# Patient Record
(demographics unavailable — no encounter records)

---

## 2025-05-09 NOTE — REVIEW OF SYSTEMS
[Seizure] : seizures [Anxiety] : anxiety [Normal] : Musculoskeletal [Fainting] : no fainting [Headache] : no headache [Dizziness] : no dizziness [Birthmarks] : no birthmarks [FreeTextEntry8] : Tics

## 2025-05-09 NOTE — HISTORY OF PRESENT ILLNESS
[FreeTextEntry1] : 5/9/2025 with the mother. Jorge reported that few years ago she witnessed 1 seizure at night. None recently. A brain MRI was normal and a REEG showed Left temporal spikes.  [facility] also reported that over the last few months Joe has been having tic like movement with his shoulders and neck She also reported stuttering. Aoll seen to appear when the child is anxious for example when she yells at him. His friends do not ask him about the tics. Not on any medications at this time.

## 2025-05-09 NOTE — ASSESSMENT
[FreeTextEntry1] : A 15 yr  old with Hx of a single observed nocturnal seizure and recent onset of Tics. I discussed counseling, Guanfacine and CBT.  Normal exam, Brain MRI.

## 2025-05-09 NOTE — PHYSICAL EXAM
[Well-appearing] : well-appearing [Normocephalic] : normocephalic [No dysmorphic facial features] : no dysmorphic facial features [No ocular abnormalities] : no ocular abnormalities [Neck supple] : neck supple [Lungs clear] : lungs clear [Heart sounds regular in rate and rhythm] : heart sounds regular in rate and rhythm [Soft] : soft [No organomegaly] : no organomegaly [No abnormal neurocutaneous stigmata or skin lesions] : no abnormal neurocutaneous stigmata or skin lesions [Straight] : straight [No ashley or dimples] : no ashley or dimples [No deformities] : no deformities [Alert] : alert [Well related, good eye contact] : well related, good eye contact [Conversant] : conversant [Normal speech and language] : normal speech and language [Follows instructions well] : follows instructions well [VFF] : VFF [Pupils reactive to light and accommodation] : pupils reactive to light and accommodation [Full extraocular movements] : full extraocular movements [No nystagmus] : no nystagmus [No papilledema] : no papilledema [Normal facial sensation to light touch] : normal facial sensation to light touch [No facial asymmetry or weakness] : no facial asymmetry or weakness [Gross hearing intact] : gross hearing intact [Equal palate elevation] : equal palate elevation [Good shoulder shrug] : good shoulder shrug [Normal tongue movement] : normal tongue movement [Midline tongue, no fasciculations] : midline tongue, no fasciculations [Normal axial and appendicular muscle tone] : normal axial and appendicular muscle tone [Gets up on table without difficulty] : gets up on table without difficulty [No pronator drift] : no pronator drift [Normal finger tapping and fine finger movements] : normal finger tapping and fine finger movements [No abnormal involuntary movements] : no abnormal involuntary movements [5/5 strength in proximal and distal muscles of arms and legs] : 5/5 strength in proximal and distal muscles of arms and legs [Walks and runs well] : walks and runs well [Able to do deep knee bend] : able to do deep knee bend [Able to walk on heels] : able to walk on heels [Able to walk on toes] : able to walk on toes [2+ biceps] : 2+ biceps [Triceps] : triceps [Knee jerks] : knee jerks [Ankle jerks] : ankle jerks [No ankle clonus] : no ankle clonus [Localizes LT and temperature] : localizes LT and temperature [No dysmetria on FTNT] : no dysmetria on FTNT [Good walking balance] : good walking balance [Normal gait] : normal gait [Able to tandem well] : able to tandem well

## 2025-05-09 NOTE — QUALITY MEASURES
[Anxiety] : Anxiety: Yes [ADHD] : ADHD: Yes [Behavioral Management plan discussed] : Behavioral Management plan discussed: Yes

## 2025-06-26 NOTE — HISTORY OF PRESENT ILLNESS
[Yes] : Patient goes to dentist yearly [Toothpaste] : Primary Fluoride Source: Toothpaste [Up to date] : Up to date [Eats meals with family] : eats meals with family [Has family members/adults to turn to for help] : has family members/adults to turn to for help [Is permitted and is able to make independent decisions] : Is permitted and is able to make independent decisions [Grade: ____] : Grade: [unfilled] [Normal Performance] : normal performance [Normal Behavior/Attention] : normal behavior/attention [Normal Homework] : normal homework [Eats regular meals including adequate fruits and vegetables] : eats regular meals including adequate fruits and vegetables [Drinks non-sweetened liquids] : drinks non-sweetened liquids  [Calcium source] : calcium source [Has friends] : has friends [At least 1 hour of physical activity a day] : at least 1 hour of physical activity a day [Has interests/participates in community activities/volunteers] : has interests/participates in community activities/volunteers. [No] : No cigarette smoke exposure [Sleep Concerns] : no sleep concerns [Has concerns about body or appearance] : does not have concerns about body or appearance [Screen time (except homework) less than 2 hours a day] : no screen time (except homework) less than 2 hours a day [FreeTextEntry1] : no meds, nkda- adderral when he was little  going to eye doctor towards the end of the summer      Discussed with patient alone:  H- lives with mom, stepmohit, sibling, feels safe E- feels safe at school A- enjoys swim D- no alcohol, no smoking, no vaping, no drug use S- +sexually active- uses condoms consistently, 1 female partner. Declines STI screening. Counseled on importance   S- mood is happy, no SI/HI, has people to talk to including mom, counselor

## 2025-06-26 NOTE — DISCUSSION/SUMMARY
[Normal Growth] : growth [Normal Development] : development  [None] : no medical problems [Full Activity without restrictions including Physical Education & Athletics] : Full Activity without restrictions including Physical Education & Athletics [] : The components of the vaccine(s) to be administered today are listed in the plan of care. The disease(s) for which the vaccine(s) are intended to prevent and the risks have been discussed with the caretaker.  The risks are also included in the appropriate vaccination information statements which have been provided to the patient's caregiver.  The caregiver has given consent to vaccinate. [FreeTextEntry1] : Continue balanced diet with all food groups. Brush teeth twice a day with toothbrush. Recommend visit to dentist. Maintain consistent daily routines and sleep schedule. Personal hygiene, puberty, and sexual health reviewed. Risky behaviors assessed. School discussed. Limit screen time to no more than 2 hours per day. Encourage physical activity. Return 1 year for routine well child check. Counseled on safe sex practices, recommended STI screening, patient declind. Counseled on importance and availability whenever patient is ready.  [id number], HPV vaccine today. Tolerated well.  Following with faiza.

## 2025-06-26 NOTE — PHYSICAL EXAM
